# Patient Record
Sex: MALE | Race: WHITE
[De-identification: names, ages, dates, MRNs, and addresses within clinical notes are randomized per-mention and may not be internally consistent; named-entity substitution may affect disease eponyms.]

---

## 2019-07-09 ENCOUNTER — HOSPITAL ENCOUNTER (EMERGENCY)
Dept: HOSPITAL 56 - MW.ED | Age: 74
Discharge: HOME | End: 2019-07-09
Payer: OTHER GOVERNMENT

## 2019-07-09 DIAGNOSIS — Z99.2: ICD-10-CM

## 2019-07-09 DIAGNOSIS — E87.6: ICD-10-CM

## 2019-07-09 DIAGNOSIS — Z79.899: ICD-10-CM

## 2019-07-09 DIAGNOSIS — I10: ICD-10-CM

## 2019-07-09 DIAGNOSIS — Z79.4: ICD-10-CM

## 2019-07-09 DIAGNOSIS — R00.1: Primary | ICD-10-CM

## 2019-07-09 DIAGNOSIS — E11.40: ICD-10-CM

## 2019-07-09 DIAGNOSIS — Z79.82: ICD-10-CM

## 2019-07-09 LAB
CHLORIDE SERPL-SCNC: 98 MMOL/L (ref 98–107)
SODIUM SERPL-SCNC: 135 MMOL/L (ref 136–148)

## 2019-07-09 PROCEDURE — 71045 X-RAY EXAM CHEST 1 VIEW: CPT

## 2019-07-09 PROCEDURE — 82962 GLUCOSE BLOOD TEST: CPT

## 2019-07-09 PROCEDURE — 36415 COLL VENOUS BLD VENIPUNCTURE: CPT

## 2019-07-09 PROCEDURE — 93005 ELECTROCARDIOGRAM TRACING: CPT

## 2019-07-09 PROCEDURE — 96374 THER/PROPH/DIAG INJ IV PUSH: CPT

## 2019-07-09 PROCEDURE — 85025 COMPLETE CBC W/AUTO DIFF WBC: CPT

## 2019-07-09 PROCEDURE — 80053 COMPREHEN METABOLIC PANEL: CPT

## 2019-07-09 PROCEDURE — 99284 EMERGENCY DEPT VISIT MOD MDM: CPT

## 2019-07-09 NOTE — EDM.PDOC
ED HPI GENERAL MEDICAL PROBLEM





- General


Stated Complaint: DIALISIS COMPLICATIONS. LOW BLOOD PRESSURE


Time Seen by Provider: 07/09/19 17:16


Source of Information: Reports: Patient, RN


History Limitations: Reports: No Limitations





- History of Present Illness


INITIAL COMMENTS - FREE TEXT/NARRATIVE: 


History of present illness:


Patient was in dialysis and was noted to have heart rate in the high 30s and 

low blood pressure. He finished his dialysis before being wheeled up to the 

emergency room. He has been having low blood pressure the past 2 days and was 

told to drink more Gatorade. It had increased from a systolic blood pressure 

from 88 to the 100s with increase fluids. He has not had any symptoms of chest 

pain, dizziness, lightheadedness but he has been sleeping more than normal. 

Patient has an appointment to [facility] in 2 days to clear clot in his fistula. 

Patient has also been told that he will need a pacemaker soon.





Review of systems: 


As per history of present illness and below otherwise all systems reviewed and 

negative.





Past medical history: 


As per history of present illness and as reviewed below otherwise 

noncontributory.





Surgical history: 


As per history of present illness and as reviewed below otherwise 

noncontributory.





Social history: 


No reported history of drug or alcohol abuse.





Family history: 


As per history of present illness and as reviewed below otherwise 

noncontributory.





Physical exam:


General: Well developed, well nourished in NAD


HEENT: Atraumatic, normocephalic, pupils reactive, negative for conjunctival 

pallor or scleral icterus, mucous membranes moist, throat clear, neck supple, 

nontender, trachea midline.


Lungs: Clear to auscultation, breath sounds equal bilaterally, chest nontender.


Heart: S1S2, regular, negative for clicks, rubs, or JVD.


Abdomen: NABS, Soft, nondistended, nontender. Negative for masses or 

hepatosplenomegaly. Negative for costovertebral tenderness.


Pelvis: Stable nontender.


Genitourinary: Deferred.


Rectal: Deferred.


Extremities: Atraumatic, negative for cords or calf pain. Neurovascular 

unremarkable.


Neuro: Awake, alert, oriented. Cranial nerves II through XII unremarkable. 

Cerebellum unremarkable. Motor and sensory unremarkable throughout. Exam 

nonfocal.


Skin:warm and dry





Diagnostics:


EKG, chest x-ray, bedside glucose?110 CBC, chemistry





Therapeutics:


Atropine half milligram given





ED Course:


Stable, I offered transfer for admission however patient prefers to go home. He 

has an appointment [facility] in 2 days [facility]





Impression: 


Bradycardia, mild hypokalemia (status post dialysis or to arrival)





Prescriptions:


None





Plan:


Follow-up with primary care tomorrow





Definitive disposition and diagnosis as appropriate pending reevaluation and 

review of above.








- Related Data


 Allergies











Allergy/AdvReac Type Severity Reaction Status Date / Time


 


No Known Allergies Allergy   Verified 07/09/19 17:19











Home Meds: 


 Home Meds





Acetaminophen 500 mg PO Q6HR 07/09/19 [History]


Albuterol/Ipratropium [DuoNeb 3.0-0.5 MG/3 ML] 1 dose INH Q6HR 07/09/19 [History

]


Allopurinol [Zyloprim] 300 mg PO DAILY 07/09/19 [History]


Apixaban [Eliquis] 2.5 mg PO BID 07/09/19 [History]


Aspirin 81 mg PO DAILY 07/09/19 [History]


Bumetanide 2 mg PO TID 07/09/19 [History]


Ca Carbonate/Vitamin D3/Vit K [Calcium + D Soft Chewable Tab] 2 tab PO BID 07/09 /19 [History]


Fish Oil/Omega-3 Fatty Acids [Fish Oil 1,000 MG] 2 cap PO BID 07/09/19 [History]


Insulin Detemir [Levemir Flextouch] 40 units INJECT ACBREAKFAST 07/09/19 [

History]


Levothyroxine 25 mcg PO DAILY 07/09/19 [History]


Potassium Chloride 20 meq PO DAILY 07/09/19 [History]


Tamsulosin HCl 0.4 cap PO DAILY 07/09/19 [History]


acetaZOLAMIDE [Acetazolamide] 250 mg PO DAILY 07/09/19 [History]


atorvaSTATin Calcium [Atorvastatin Calcium] 20 mg PO BEDTIME 07/09/19 [History]


glipiZIDE [Glucotrol XL] 5 mg PO DAILY 07/09/19 [History]


metOLazone [Metolazone] 5 mg PO DAILY 07/09/19 [History]











ED ROS GENERAL





- Review of Systems


Review Of Systems: See Below





ED EXAM, GENERAL





- Physical Exam


Exam: See Below





Course





- Vital Signs


Last Recorded V/S: 


 Last Vital Signs











Temp  97.2 F   07/09/19 17:19


 


Pulse  47 L  07/09/19 17:19


 


Resp  20   07/09/19 17:19


 


BP  103/55 L  07/09/19 17:19


 


Pulse Ox  99   07/09/19 17:19














- Orders/Labs/Meds


Orders: 


 Active Orders 24 hr











 Category Date Time Status


 


 EKG Documentation Completion [RC] STAT Care  07/09/19 17:17 Active


 


 Sodium Chloride 0.9% [Saline Flush] Med  07/09/19 17:17 Active





 10 ml FLUSH ASDIRECTED PRN   


 


 Sodium Chloride 0.9% [Saline Flush] Med  07/09/19 17:17 Active





 2.5 ml FLUSH ASDIRECTED PRN   


 


 Saline Lock Insert [OM.PC] Stat Oth  07/09/19 17:17 Ordered








 Medication Orders





Sodium Chloride (Saline Flush)  10 ml FLUSH ASDIRECTED PRN


   PRN Reason: Keep Vein Open


   Last Admin: 07/09/19 17:48  Dose: 10 ml


Sodium Chloride (Saline Flush)  2.5 ml FLUSH ASDIRECTED PRN


   PRN Reason: Keep Vein Open


   Last Admin: 07/09/19 17:48  Dose: 2.5 ml








Labs: 


 Laboratory Tests











  07/09/19 07/09/19 07/09/19 Range/Units





  17:25 17:25 17:39 


 


WBC  9.36    (4.0-11.0)  K/uL


 


RBC  3.65 L    (4.50-5.90)  M/uL


 


Hgb  11.5 L    (13.0-17.0)  g/dL


 


Hct  36.8 L    (38.0-50.0)  %


 


MCV  100.8 H    (80.0-98.0)  fL


 


MCH  31.5    (27.0-32.0)  pg


 


MCHC  31.3    (31.0-37.0)  g/dL


 


RDW Std Deviation  47.9    (28.0-62.0)  fl


 


RDW Coeff of Wilma  13    (11.0-15.0)  %


 


Plt Count  188    (150-400)  K/uL


 


MPV  9.80    (7.40-12.00)  fL


 


Neut % (Auto)  78.6    (48.0-80.0)  %


 


Lymph % (Auto)  7.8 L    (16.0-40.0)  %


 


Mono % (Auto)  10.4    (0.0-15.0)  %


 


Eos % (Auto)  3.0    (0.0-7.0)  %


 


Baso % (Auto)  0.2    (0.0-1.5)  %


 


Neut # (Auto)  7.4 H    (1.4-5.7)  K/uL


 


Lymph # (Auto)  0.7    (0.6-2.4)  K/uL


 


Mono # (Auto)  1.0 H    (0.0-0.8)  K/uL


 


Eos # (Auto)  0.3    (0.0-0.7)  K/uL


 


Baso # (Auto)  0.0    (0.0-0.1)  K/uL


 


Nucleated RBC %  0.0    /100WBC


 


Nucleated RBCs #  0    K/uL


 


Sodium   135 L   (136-148)  mmol/L


 


Potassium   3.1 L   (3.5-5.1)  mmol/L


 


Chloride   98   ()  mmol/L


 


Carbon Dioxide   28.9   (21.0-32.0)  mmol/L


 


BUN   11   (7.0-18.0)  mg/dL


 


Creatinine   2.7 H   (0.8-1.3)  mg/dL


 


Est Cr Clr Drug Dosing   28.33   mL/min


 


Estimated GFR (MDRD)   23.3   ml/min


 


Glucose   110 H   ()  mg/dL


 


POC Glucose    110  ()  mg/dL


 


Calcium   8.1 L   (8.5-10.1)  mg/dL


 


Total Bilirubin   0.4   (0.2-1.0)  mg/dL


 


AST   16   (15-37)  IU/L


 


ALT   10 L   (14-63)  IU/L


 


Alkaline Phosphatase   92   ()  U/L


 


Total Protein   6.7   (6.4-8.2)  g/dL


 


Albumin   3.2 L   (3.4-5.0)  g/dL


 


Globulin   3.5   (2.6-4.0)  g/dL


 


Albumin/Globulin Ratio   0.9   (0.9-1.6)  











Meds: 


Medications











Generic Name Dose Route Start Last Admin





  Trade Name Freq  PRN Reason Stop Dose Admin


 


Sodium Chloride  10 ml  07/09/19 17:17  07/09/19 17:48





  Saline Flush  FLUSH   10 ml





  ASDIRECTED PRN   Administration





  Keep Vein Open   





     





     





     


 


Sodium Chloride  2.5 ml  07/09/19 17:17  07/09/19 17:48





  Saline Flush  FLUSH   2.5 ml





  ASDIRECTED PRN   Administration





  Keep Vein Open   





     





     





     














Discontinued Medications














Generic Name Dose Route Start Last Admin





  Trade Name Antoni  PRN Reason Stop Dose Admin


 


Atropine Sulfate  0.5 mg  07/09/19 17:47  07/09/19 17:48





  Atropine 0.1 Mg/Ml  IVPUSH  07/09/19 17:48  0.5 mg





  ONETIME ONE   Administration





     





     





     





     














Departure





- Departure


Time of Disposition: 18:27


Disposition: Home, Self-Care 01


Condition: Good


Clinical Impression: 


 Bradycardia








- Discharge Information


*PRESCRIPTION DRUG MONITORING PROGRAM REVIEWED*: No


*COPY OF PRESCRIPTION DRUG MONITORING REPORT IN PATIENT CHERISE: No


Instructions:  Bradycardia, Adult


Referrals: 


PCP,Unknown [Primary Care Provider] - 


Forms:  ED Department Discharge


Additional Instructions: 


The following information is given to patients seen in the emergency department 

who are being discharged to home. This information is to outline your options 

for follow-up care. We provide all patients seen in our emergency department 

with a follow-up referral.





The need for follow-up, as well as the timing and circumstances, are variable 

depending upon the specifics of your emergency department visit.





If you don't have a primary care physician on staff, we will provide you with a 

referral. We always advise you to contact your personal physician following an 

emergency department visit to inform them of the circumstance of the visit and 

for follow-up with them and/or the need for any referrals to a consulting 

specialist.





The emergency department will also refer you to a specialist when appropriate. 

This referral assures that you have the opportunity for follow-up care with a 

specialist. All of these measure are taken in an effort to provide you with 

optimal care, which includes your follow-up.





Under all circumstances we always encourage you to contact your private 

physician who remains a resource for coordinating your care. When calling for 

follow-up care, please make the office aware that this follow-up is from your 

recent emergency room visit. If for any reason you are refused follow-up, 

please contact the [facility] Emergency 

Department at [phone] and asked to speak to the emergency department 

charge nurse.





[facility]


Primary Care


[address] [place] [id number]


Phone: [phone]


Fax: [phone]





- My Orders


Last 24 Hours: 


My Active Orders





07/09/19 17:17


EKG Documentation Completion [RC] STAT 


Sodium Chloride 0.9% [Saline Flush]   10 ml FLUSH ASDIRECTED PRN 


Sodium Chloride 0.9% [Saline Flush]   2.5 ml FLUSH ASDIRECTED PRN 


Saline Lock Insert [OM.PC] Stat 














- Assessment/Plan


Last 24 Hours: 


My Active Orders





07/09/19 17:17


EKG Documentation Completion [RC] STAT 


Sodium Chloride 0.9% [Saline Flush]   10 ml FLUSH ASDIRECTED PRN 


Sodium Chloride 0.9% [Saline Flush]   2.5 ml FLUSH ASDIRECTED PRN 


Saline Lock Insert [OM.PC] Stat

## 2019-07-09 NOTE — CR
INDICATION:



Chest pain, shortness of breath



TECHNIQUE:



Single View of the chest 



COMPARISON:



None available 



FINDINGS:



Evaluation is limited due to single portable technique. There is a 

right-sided central venous catheter with the tip projecting over the mid 

SVC. The heart is normal in size. There is mild prominence within the right 

hilum which may be due to a prominent vessel versus enlarged lymph nodes. 

The lungs are grossly clear. The left costophrenic angle is not visualized. 

The right costophrenic angle is sharp. 



IMPRESSION:



1. Somewhat limited exam due to portable technique and overlapping soft 

tissue. Mild prominence of the right hilum may represent vessels versus an 

enlarged lymph node.



2. Otherwise negative for acute cardiopulmonary process.



Dictated by Brianda Duffy MD @ 7/9/2019 6:18:07 PM



Dictated by: Brianda Duffy MD @ 07/09/2019 18:18:12



(Electronically Signed)

## 2019-10-14 ENCOUNTER — HOSPITAL ENCOUNTER (OUTPATIENT)
Dept: HOSPITAL 56 - MW.SDS | Age: 74
Discharge: HOME | End: 2019-10-14
Attending: SURGERY
Payer: COMMERCIAL

## 2019-10-14 DIAGNOSIS — E11.22: ICD-10-CM

## 2019-10-14 DIAGNOSIS — M10.9: ICD-10-CM

## 2019-10-14 DIAGNOSIS — E03.9: ICD-10-CM

## 2019-10-14 DIAGNOSIS — I13.0: ICD-10-CM

## 2019-10-14 DIAGNOSIS — Z45.2: Primary | ICD-10-CM

## 2019-10-14 DIAGNOSIS — N18.6: ICD-10-CM

## 2019-10-14 DIAGNOSIS — I50.9: ICD-10-CM

## 2019-10-14 DIAGNOSIS — Z79.01: ICD-10-CM

## 2019-10-14 DIAGNOSIS — Z79.899: ICD-10-CM

## 2019-10-14 DIAGNOSIS — Z79.82: ICD-10-CM

## 2019-10-14 DIAGNOSIS — Z79.4: ICD-10-CM

## 2019-10-14 PROCEDURE — 88300 SURGICAL PATH GROSS: CPT

## 2019-10-14 PROCEDURE — 36589 REMOVAL TUNNELED CV CATH: CPT

## 2019-10-14 NOTE — OR
SURGEON:

Raymundo Wilde M.D.

 

DATE OF PROCEDURE:  10/14/2019

 

OPERATION PERFORMED:

Removal of Erasmo dialysis catheter.

 

PRIMARY SURGEON:

Raymundo Wilde M.D.

 

ANESTHESIA:

Local MAC.

 

ASA CLASSIFICATION:

IV.

 

PREOPERATIVE DIAGNOSIS:

Chronic renal failure with functioning AV shunt, desire for dialysis catheter

removal.

 

POSTOPERATIVE DIAGNOSIS:

Chronic renal failure with functioning AV shunt, desire for dialysis catheter

removal.

 

ESTIMATED BLOOD LOSS:

2 mL.

 

INTRAOPERATIVE FLUID REPLACEMENT:

150 mL of crystalloid.

 

DESCRIPTION OF PROCEDURE:

The patient was taken to the operating room and placed on the operating table in

the supine position.  Time-out was called for appropriate identification of the

patient and procedure.  Monitored anesthesia care was provided.  The surgical

site in the right anterior chest was prepped with ChloraPrep solution and

sterile drapes were applied.  The Dacron cuff was palpated and the skin

surrounding that was infiltrated with 1% Xylocaine and 0.5% Marcaine solution.

The skin incision was made and dissection carried directly down to the Dacron

cuff.  This was circumferentially dissected free.  The catheter was clamped and

then cut removing the distal portion.  The proximal portion was then removed

without difficulty.  Pressure was held over the insertion site for full 2

minutes.  When pressure was released, there was no bleeding.  Small bleeding

sites were electrocoagulated.  The incision was closed in 2 layers approximating

the subcutaneous tissue with 3-0 Vicryl and the skin with subcuticular 4-0

Monocryl.  The incision was reinforced with Steri-Strips and sterile Tegaderm

pad was placed as a dressing.

 

Sponge, needle, and instrument counts were all correct.  The patient tolerated

the procedure well and was taken to recovery room in stable condition.

HEMA / LEXX

DD:  10/14/2019 08:33:27

DT:  10/14/2019 14:13:29

Job #:  [id number]

## 2019-10-14 NOTE — PCM.OPNOTE
- General Post-Op/Procedure Note


Date of Surgery/Procedure: 10/14/19


Operative Procedure(s): Removal Erasmo dialysis catheter


Pre Op Diagnosis: End stage renal disease.  Desire for dialysis catheter removal


Post-Op Diagnosis: Same


Anesthesia Technique: MAC (ASA IV)


Primary Surgeon: Raymundo Wilde


Fluid Replacement, Intraop: 150


EBL in mLs: 2


Condition: Good


Free Text/Narrative:: 





DICTATION [id number]





CPT CODE 10095

## 2019-10-14 NOTE — PCM.PREANE
Preanesthetic Assessment





- Anesthesia/Transfusion/Family Hx


Anesthesia History: Prior Anesthesia Without Reaction


Other Type of Anesthesia Reaction Comment: "hard to wake up"


Transfusion History: No Prior Transfusion(s)





- Physical Assessment


NPO Status Date: 10/13/19


Height: 6 ft 3 in


Weight: 74.389 kg


ASA Class: 4


Airway Class: Mallampati = 2


Dentition: Reports: Edentulous


ROM/Head Extension: Full


Lungs: Clear to Auscultation, Normal Respiratory Effort


Cardiovascular: Regular Rate





- Allergies


Allergies/Adverse Reactions: 


 Allergies











Allergy/AdvReac Type Severity Reaction Status Date / Time


 


No Known Allergies Allergy   Verified 10/09/19 07:54














- Blood


Blood Available: No





- Anesthesia Plan


Pre-Op Medication Ordered: None





- Acknowledgements


Anesthesia Type Planned: General Anesthesia


Pt an Appropriate Candidate for the Planned Anesthesia: Yes


Alternatives and Risks of Anesthesia Discussed w Pt/Guardian: Yes


Pt/Guardian Understands and Agrees with Anesthesia Plan: Yes


Additional Comments: 





PMH: O2 dependant COPD, uses O2, 24 hr /day


   heart failure ? EF, not in our medical records


   prostate cancer - [facility]


   CKD5 - on dialysis tues and Sat


   DM2 on insulin, last insulin dose yest afternoon


   atrial fibrilation, rate controlled, stopped eliquis 6 days ago


   MARQUES- used CPAP 8 hours/ night, every night, and when lying down during the 

day


   hx of gout, hld, thyroid replacement





PLAN: tiva, propofol only, no benzo, no narcotic, no volatile agent, LMA for 

airway support





PreAnesthesia Questionnaire


HEENT History: Reports: Other (See Below)


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 

not wear his hearing aids


Cardiovascular History: Reports: Afib, Heart Murmur, High Cholesterol, 

Hypertension, SOB on Exertion


Other Cardiovascular History: CHF, murmur in the past, peripheral edema (wife 

states she ace wraps his legs to help wih his edema), hx rheumatic fever


Respiratory History: Reports: Sleep Apnea, SOB


Other Respiratory History: uses CPAP, continuous 02, hx of agent orange exposure


Gastrointestinal History: Reports: None


Genitourinary History: Reports: BPH, Dialysis, Prostate Disorder, Renal Disease

, Other (See Below)


Other Genitourinary History: dialysis on tuesdays and saturdays


Musculoskeletal History: Reports: Arthritis, Gout


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 

short distance


Neurological History: Reports: Neuropathy, Diabetic, Neuropathy, Peripheral


Psychiatric History: Reports: None


Endocrine/Metabolic History: Reports: Diabetes, Type II, Hypothyroidism, Obesity

/BMI 30+


Hematologic History: Reports: Iron Deficiency


Immunologic History: Reports: None


Oncologic (Cancer) History: Reports: Prostate


Dermatologic History: Reports: Other (See Below)


Other Dermatologic History: sores in genital area on occasion





- Infectious Disease History


Infectious Disease History: Reports: Chicken Pox





- Past Surgical History


Head Surgeries/Procedures: Reports: None


HEENT Surgical History: Reports: Naso-Sinus Surgery


Other HEENT Surgeries/Procedures: nasal polyp removal


Cardiovascular Surgical History: Reports: None


Respiratory Surgical History: Reports: None


GI Surgical History: Reports: None


Male  Surgical History: Reports: Prostate Biopsy


Endocrine Surgical History: Reports: None


Neurological Surgical History: Reports: None


Musculoskeletal Surgical History: Reports: None


Oncologic Surgical History: Reports: None


Dermatological Surgical History: Reports: None





- SUBSTANCE USE


Smoking Status *Q: Never Smoker


Tobacco Use Within Last Twelve Months: No


Recreational Drug Use History: No





- HOME MEDS


Home Medications: 


 Home Meds





Albuterol/Ipratropium [DuoNeb 3.0-0.5 MG/3 ML] 1 dose INH Q6HR PRN 07/09/19 [

History]


Allopurinol [Zyloprim] 300 mg PO DAILY 07/09/19 [History]


Apixaban [Eliquis] 2.5 mg PO BID 07/09/19 [History]


Aspirin 81 mg PO DAILY 07/09/19 [History]


Bumetanide 2 mg PO ASDIRECTED 07/09/19 [History]


Ca Carbonate/Vitamin D3/Vit K [Calcium + D Soft Chewable Tab] 2 tab PO BID 07/09 /19 [History]


Insulin Detemir [Levemir Flextouch] 20 units INJECT ACBREAKFAST 07/09/19 [

History]


Levothyroxine 25 mcg PO DAILY 07/09/19 [History]


Potassium Chloride 20 meq PO DAILY 07/09/19 [History]


Tamsulosin HCl 0.4 cap PO DAILY 07/09/19 [History]


acetaZOLAMIDE [Acetazolamide] 250 mg PO BID 07/09/19 [History]


atorvaSTATin Calcium [Atorvastatin Calcium] 10 mg PO BEDTIME 07/09/19 [History]


metOLazone [Metolazone] 10 mg PO DAILY 07/09/19 [History]


Omega-3 Acid Ethyl Esters [Lovaza] 1 gm PO DAILY 10/09/19 [History]











- CURRENT (IN HOUSE) MEDS


Current Meds: 





 Current Medications





Lactated Ringer's (Ringers, Lactated)  1,000 mls @ 125 mls/hr IV ASDIRECTED NAMAN

## 2019-10-14 NOTE — PCM.POSTAN
POST ANESTHESIA ASSESSMENT





- MENTAL STATUS


Mental Status: Alert, Oriented





- VITAL SIGNS


Vital Signs: 


 Last Vital Signs











Temp  97.3 F   10/14/19 08:53


 


Pulse  52 L  10/14/19 08:53


 


Resp  16   10/14/19 08:53


 


BP  94/55 L  10/14/19 08:53


 


Pulse Ox  96   10/14/19 08:53














- RESPIRATORY


Respiratory Status: Respiratory Rate WNL, Airway Patent, O2 Saturation Stable





- CARDIOVASCULAR


CV Status: Pulse Rate WNL, Blood Pressure Stable





- GASTROINTESTINAL


GI Status: No Symptoms





- POST OP HYDRATION


Hydration Status: Adequate & Stable

## 2019-10-14 NOTE — PCM48HPAN
Post Anesthesia Note





- EVALUATION WITHIN 48HRS OF ANESTHETIC


Vital Signs in Normal Range: Yes


Patient Participated in Evaluation: Yes


Respiratory Function Stable: Yes


Cardiovascular Function Stable: Yes


Hydration Status Stable: Yes


Pain Control Satisfactory: Yes


Nausea and Vomiting Control Satisfactory: Yes


Mental Status Recovered: Yes


Vital Signs: 


 Last Vital Signs











Temp  97.3 F   10/14/19 08:53


 


Pulse  52 L  10/14/19 08:53


 


Resp  16   10/14/19 08:53


 


BP  94/55 L  10/14/19 08:53


 


Pulse Ox  96   10/14/19 08:53

## 2021-04-20 ENCOUNTER — HOSPITAL ENCOUNTER (EMERGENCY)
Dept: HOSPITAL 41 - JD.ED | Age: 76
Discharge: HOME | End: 2021-04-20
Payer: OTHER GOVERNMENT

## 2021-04-20 DIAGNOSIS — Z79.01: ICD-10-CM

## 2021-04-20 DIAGNOSIS — I48.91: ICD-10-CM

## 2021-04-20 DIAGNOSIS — F03.90: ICD-10-CM

## 2021-04-20 DIAGNOSIS — Z79.4: ICD-10-CM

## 2021-04-20 DIAGNOSIS — E66.9: ICD-10-CM

## 2021-04-20 DIAGNOSIS — F45.8: ICD-10-CM

## 2021-04-20 DIAGNOSIS — J44.9: ICD-10-CM

## 2021-04-20 DIAGNOSIS — E03.9: ICD-10-CM

## 2021-04-20 DIAGNOSIS — I10: Primary | ICD-10-CM

## 2021-04-20 DIAGNOSIS — E78.00: ICD-10-CM

## 2021-04-20 DIAGNOSIS — Z99.2: ICD-10-CM

## 2021-04-20 DIAGNOSIS — N40.0: ICD-10-CM

## 2021-04-20 DIAGNOSIS — E11.40: ICD-10-CM

## 2021-04-20 DIAGNOSIS — Z79.899: ICD-10-CM

## 2021-04-20 NOTE — EDM.PDOC
ED HPI GENERAL MEDICAL PROBLEM





- General


Chief Complaint: Cardiovascular Problem


Stated Complaint: [facility]


Time Seen by Provider: 04/20/21 20:08


Source of Information: Reports: Family (Wife + daughter)


History Limitations: Reports: Altered Mental Status (Pt has dementia)





- History of Present Illness


INITIAL COMMENTS - FREE TEXT/NARRATIVE: 





Mr. Diaz is a pleasantly demented 75-year-old gentleman who is now brought 

to the ED by EMS for hypotension.  According the patient's wife and daughter, 

the patient was in a long-term care facility in the [place] area, then was 

admitted to the hospital in [place], then transferred to [facility] this afternoon.  [facility] found the patient's BP to be 

depressed at 87/51, therefore transferred him here for evaluation, however, the 

patient's family tells me that the patient always has hypotension, apparently 

due to autonomic dysfunction, as he is given midodrine prior to his dialysis 

every Monday, Wednesday, and Friday.  His BP is usually around 70/40.





The patient's family tells me that the patient fell about 6 weeks ago, and that 

he had been refusing to walk.  An outpatient x-ray was obtained at the [facility] yesterday, with a reading of a fracture of his right tibia being given 

today.





Here in the ED tonight, the patient's initial BP is found to be depressed at 

88/54, with bradycardia 55 bpm.  He is afebrile, saturating 100% on 2 L of 

oxygen per nasal cannula.  Without treatment, a liter BP was up to 90/59.  The 

patient is sleeping, which his family tells me he usually does during the day.





The patient's family denies that the patient has had a recent fever, chills, 

sore throat, ear pain, nasal or sinus congestion, cough, dyspnea, chest pain, 

palpitations, nausea, vomiting, constipation, diarrhea, abdominal pain, urinary 

symptoms, recent weight gain or weight loss, recent bloody bowel movements or 

black bowel movements, headaches, or rashes.








The patient's PCP is Dr. Neal Giordano the [facility], however, I 

believe his PCP at [facility] will be Dr. David Dumont.


The family does not recall the name of his Nephrologist.








  ** Right Hip


Pain Score (Numeric/FACES): 5





- Related Data


                                    Allergies











Allergy/AdvReac Type Severity Reaction Status Date / Time


 


No Known Allergies Allergy   Verified 04/20/21 19:54











Home Meds: 


                                    Home Meds





Apixaban [Eliquis] 5 mg PO BID 07/09/19 [History]


Tamsulosin HCl 0.4 cap PO DAILY 07/09/19 [History]


atorvaSTATin Calcium [Atorvastatin Calcium] 10 mg PO BEDTIME 07/09/19 [History]


Acetaminophen [Pain Relief] 650 mg PO Q4H PRN 04/20/21 [History]


Benzonatate 100 mg PO TID PRN 04/20/21 [History]


Carboxymethylcellulose Sodium [Artificial Tears] 1 drop OP ASDIRECTED PRN 

04/20/21 [History]


Citalopram Hydrobromide [Celexa] 20 mg PO DAILY 04/20/21 [History]


Docusate Sodium/Sennosides [Senna Plus] 1 each PO BID PRN 04/20/21 [History]


Fish Oil/DHA/EPA [Fish Oil 1,200 MG] 2 each PO BID 04/20/21 [History]


Insulin Aspart [NovoLOG] 1 unit SQ TID 04/20/21 [History]


Magnesium Oxide 400 mg PO BID 04/20/21 [History]


Melatonin 5 mg PO BEDTIME 04/20/21 [History]


traMADol [Ultram] 50 mg PO BID 04/20/21 [History]











Past Medical History


HEENT History: Reports: Hard of Hearing, Impaired Vision


Cardiovascular History: Reports: Afib, High Cholesterol, Hypertension


Respiratory History: Reports: COPD (suspected, not PFT-tested, on 2L O2 

continuously), Sleep Apnea (nightly CPAP + O2)


Genitourinary History: Reports: BPH, Dialysis (Q M, W, F x 2016)


Musculoskeletal History: Reports: Osteoarthritis


Neurological History: Reports: Neuropathy, Diabetic


Psychiatric History: Reports: Dementia, Depression


Endocrine/Metabolic History: Reports: Diabetes, Type II, Hypothyroidism, 

Obesity/BMI 30+


Oncologic (Cancer) History: Reports: Prostate





- Infectious Disease History


Infectious Disease History: Reports: Chicken Pox





- Past Surgical History


HEENT Surgical History: Reports: Naso-Sinus Surgery (nasal polyp removal)


Cardiovascular Surgical History: Reports: Vascular Surgery (RUE AVG), Other (See

 Below) (Coronary angiogram x 1)


Male  Surgical History: Reports: Prostate Biopsy





Social & Family History





- Tobacco Use


Tobacco Use Status *Q: Never Tobacco User





- Caffeine Use


Caffeine Use: Reports: None





- Alcohol Use


Alcohol Use History: No





- Recreational Drug Use


Recreational Drug Use: No





- Living Situation & Occupation


Living situation: Reports: , Extended Care Facility ([facility])


Occupation: Retired





ED ROS GENERAL





- Review of Systems


Review Of Systems: Comprehensive ROS is negative, except as noted in HPI.





ED EXAM, GENERAL





- Physical Exam


Exam: See Below


Exam Limited By: No Limitations


General Appearance: WD/WN, No Apparent Distress (sleeping = normal for him, 

according to family)


Eye Exam: Bilateral Eye: EOMI, Normal Inspection


Ears: Normal External Exam


Nose: Normal Inspection


Throat/Mouth: Normal Inspection, Normal Lips, No Airway Compromise


Head: Atraumatic, Normocephalic


Neck: Normal Inspection, Full Range of Motion


Respiratory/Chest: No Respiratory Distress, Lungs Clear, Normal Breath Sounds, 

No Accessory Muscle Use


Cardiovascular: Normal Peripheral Pulses, No Gallop, No JVD, No Murmur, No Rub, 

Bradycardia


Peripheral Pulses: 3+: Radial (L), Radial (R)


GI/Abdominal: Normal Bowel Sounds, Soft, Non-Tender, No Organomegaly, No 

Distention, No Abnormal Bruit, No Mass


Back Exam: Normal Inspection, Full Range of Motion, NT


Extremities: Normal Inspection, Normal Range of Motion, Normal Capillary Refill,

 Other (AV graft palpable distal ventral upper right arm)


Neurological: No Motor/Sensory Deficits, Confused (when woke = normal, per the 

patient's family)


Skin Exam: Warm, Dry, Intact, Normal Color, No Rash





Course





- Vital Signs


Last Recorded V/S: 


                                Last Vital Signs











Temp  36.2 C   04/20/21 19:54


 


Pulse  55 L  04/20/21 19:54


 


Resp  16   04/20/21 19:54


 


BP  88/54 L  04/20/21 19:54


 


Pulse Ox  100   04/20/21 19:54














- Re-Assessments/Exams


Free Text/Narrative Re-Assessment/Exam: 





04/20/21 20:35


As above, the patient was admitted to [facility] this afternoon for

 rehab, with a finding of hypotension of 87/51, prompting him to be sent to the 

ED for evaluation, however, the patient's family tells me that the patient's BP 

always runs low, typically around 70/40, and that it was even lower while he was

 hospitalized in [place] recently.  His hypertension is apparently due to 

autonomic dysfunction, as the patient receives midodrine prior to each dialysis.

  No concerning symptoms, such as a recent fever, cough, dyspnea, vomiting, 

diarrhea, or urinary symptoms.  Here in the ED, the patient's initial BP was 

somewhat elevated for him at 88/54, then went up to 90/59, all without 

treatment.  I explained to the patient's family that I do not see an indication 

for a work-up at this time, however, if he develops symptoms, to have him 

returned to the ED for evaluation.  They are in agreement.  We will return the 

patient to [facility].














Departure





- Departure


Time of Disposition: 20:38


Disposition: Home, Self-Care 01


Condition: Good


Clinical Impression: 


 Hypotension, chronic, Autonomic dysfunction





Instructions:  Hypotension


Referrals: 


David Dumont MD [Primary Care Provider] - 


Neal Giordano MD [Ordering Only Provider] - 


Forms:  ED Department Discharge


Additional Instructions: 


Mr. Diaz was seen in the emergency room for low blood pressure at [facility].





Here in the ED, his initial BP was 88/54, but went up to 90/59, without 

treatment.





We learned that Mr. Diaz's blood pressure always runs low, due to autonomic 

dysfunction.  It is treated with midodrine prior to each dialysis.





As Mr. Diaz is not otherwise sick, we found no indication for a work-up in 

the ER.





If Mr. Diaz develops concerning symptoms, such as a fever, cough, shortness 

of breath, vomiting, diarrhea, etc., please return him to the ER for 

reevaluation.





Sepsis Event Note (ED)





- Evaluation


Sepsis Screening Result: No Definite Risk





- Focused Exam


Vital Signs: 


                                   Vital Signs











  Temp Pulse Resp BP Pulse Ox


 


 04/20/21 19:54  36.2 C  55 L  16  88/54 L  100